# Patient Record
Sex: FEMALE | URBAN - METROPOLITAN AREA
[De-identification: names, ages, dates, MRNs, and addresses within clinical notes are randomized per-mention and may not be internally consistent; named-entity substitution may affect disease eponyms.]

---

## 2017-08-19 LAB — CREATININE, EXTERNAL: 0.63

## 2018-04-03 ENCOUNTER — OFFICE VISIT (OUTPATIENT)
Dept: ENDOCRINOLOGY | Age: 38
End: 2018-04-03

## 2018-04-03 VITALS
WEIGHT: 169 LBS | OXYGEN SATURATION: 98 % | DIASTOLIC BLOOD PRESSURE: 77 MMHG | BODY MASS INDEX: 38.02 KG/M2 | HEIGHT: 56 IN | RESPIRATION RATE: 16 BRPM | HEART RATE: 80 BPM | SYSTOLIC BLOOD PRESSURE: 100 MMHG

## 2018-04-03 DIAGNOSIS — E03.9 ACQUIRED HYPOTHYROIDISM: Primary | ICD-10-CM

## 2018-04-03 RX ORDER — NORETHINDRONE ACETATE AND ETHINYL ESTRADIOL 1.5; 3 MG/1; UG/1
TABLET ORAL
Refills: 1 | COMMUNITY
Start: 2018-03-30

## 2018-04-03 RX ORDER — ACETAMINOPHEN AND CODEINE PHOSPHATE 300; 30 MG/1; MG/1
TABLET ORAL
Refills: 0 | COMMUNITY
Start: 2018-03-23

## 2018-04-03 RX ORDER — LEVOTHYROXINE SODIUM 125 UG/1
TABLET ORAL
Refills: 6 | COMMUNITY
Start: 2018-03-21 | End: 2018-04-05 | Stop reason: DRUGHIGH

## 2018-04-03 RX ORDER — TOPIRAMATE 25 MG/1
TABLET ORAL 2 TIMES DAILY
COMMUNITY

## 2018-04-03 RX ORDER — LEVOTHYROXINE SODIUM 112 UG/1
TABLET ORAL
Refills: 2 | COMMUNITY
Start: 2018-01-01 | End: 2018-04-05 | Stop reason: DRUGHIGH

## 2018-04-03 NOTE — MR AVS SNAPSHOT
[address] [facility] 
[phone] Patient: Evelio Dawson MRN: [id number] Visit Information Date & Time Provider Department Dept. Phone Encounter #  
 4/3/2018 11:10 AM Kori Kemp, [address] [facility] Endocr[facility]ology [phone] Upcoming Health Maintenance Date Due DTaP/Tdap/Td series (1 - Tdap) 1/19/2001 PAP AKA CERVICAL CYTOLOGY 1/19/2001 Influenza Age 5 to Adult 8/1/2017 Allergies as of 4/3/2018  Review Complete On: 4/3/2018 By: Kori Kemp MD  
  
 Severity Noted Reaction Type Reactions Fioricet [Butalbital-acetaminophen-caff] High 09/26/2016    Hives Current Immunizations  Never Reviewed No immunizations on file. Not reviewed this visit You Were Diagnosed With   
  
 Codes Comments Acquired hypothyroidism    -  Primary ICD-10-CM: E03.9 ICD-9-CM: 083. 9 Vitals BP Pulse Resp Height(growth percentile) Weight(growth percentile) SpO2  
 100/77 80 16 4' 8\" (1.422 m) 169 lb (76.7 kg) 98% BMI OB Status Smoking Status 37.89 kg/m2 Having regular periods Never Smoker Vitals History BMI and BSA Data Body Mass Index Body Surface Area  
 37.89 kg/m 2 1.74 m 2 Preferred Pharmacy Pharmacy Name Phone [facility] [address] [facility] [phone] Your Updated Medication List  
  
   
This list is accurate as of 4/3/18 12:18 PM.  Always use your most recent med list.  
  
  
  
  
 acetaminophen-codeine 300-30 mg per tablet Commonly known as:  TYLENOL #3 EXCEDRIN EXTRA STRENGTH 250-250-65 mg per tablet Generic drug:  aspirin-acetaminophen-caffeine Take 2 Tabs by mouth every six (6) hours as needed for Headache. FLUoxetine 20 mg capsule Commonly known as:  PROzac  
1 po daily HYDROcodone-acetaminophen  mg tablet Commonly known as:  Gabriella Alex TK 1 T PO BID PRF MIGRAINE  
  
 ibuprofen 200 mg tablet Commonly known as:  MOTRIN Take 800 mg by mouth every eight (8) hours as needed for Pain.  
  
 ketorolac 10 mg tablet Commonly known as:  TORADOL  
1 or 2 po bid prn pain, take with food, do not take with other NSAIDs, do not exceed 4 pills daily * levothyroxine 75 mcg tablet Commonly known as:  SYNTHROID  
1 po daily * levothyroxine 112 mcg tablet Commonly known as:  SYNTHROID TK 1 T PO D  
  
 * levothyroxine 125 mcg tablet Commonly known as:  SYNTHROID TK 1 T PO QD IN THE MORNING OES  
  
 MICROGESTIN 1.5/30 (21) 1.5-30 mg-mcg Tab Generic drug:  norethindrone ac-eth estradiol TK 1 T PO QD TAKE ACTIVE TABLETS CONTINOUSLY  
  
 multivitamin tablet Commonly known as:  ONE A DAY Take 1 Tab by mouth daily. naproxen sodium 220 mg tablet Commonly known as:  NAPROSYN Take 660 mg by mouth every eight (8) hours as needed. * topiramate 25 mg tablet Commonly known as:  TOPAMAX Take  by mouth two (2) times a day. * topiramate 50 mg tablet Commonly known as:  TOPAMAX 1 po bid ZOFRAN (AS HYDROCHLORIDE) 4 mg tablet Generic drug:  ondansetron hcl Take 4 mg by mouth every eight (8) hours as needed for Nausea. * Notice: This list has 5 medication(s) that are the same as other medications prescribed for you. Read the directions carefully, and ask your doctor or other care provider to review them with you. We Performed the Following T3, FREE [id number] CPT(R)] TSH AND FREE T4 [78484 CPT(R)] Patient Instructions Take your levothyroxine (thyroid medication) first thing in the morning with a glass of water. Wait at least 30 minutes before eating, drinking coffee, or taking other medications.  
 
Wait 2-4 hours before taking any supplements containing calcium or iron (ie: multivitamins or prenatal vitamins).   
 
============================================================================== For fertility, goal TSH is 0.5 to 2.5 Thyroid hormone requirements often increase by about 25% in pregnancy When you find out you're pregnant, take two extra tablets per week and notify me 
 
============================================================================== It is important to note that during pregnancy, TSH concentrations are typically lower than prior to pregnancy due to increased hCG, and frequently TSH is below the lower limit of 0.4 mIU/L. Total T4 may also be high due to increased SHBG. Trimester-specific goals recommended by the SHARRI are as follows: - First trimester: 0.12.5 mIU/L 
 - Second trimester: 0.23.0 mIU/L 
 - Third trimester: 0.33.0 mIU/L Introducing [facility] & [facility] SERVICES! Wenceslao Cotton introduces [employer] patient portal. Now you can access parts of your medical record, email your doctor's office, and request medication refills online. 1. In your internet browser, go to [email or web address] 2. Click on the First Time User? Click Here link in the Sign In box. You will see the New Member Sign Up page. 3. Enter your [employer] Access Code exactly as it appears below. You will not need to use this code after youve completed the sign-up process. If you do not sign up before the expiration date, you must request a new code. · [employer] Access Code: [email or web address] Expires: 7/2/2018 12:18 PM 
 
4. Enter the last four digits of your Social Security Number (xxxx) and Date of Birth (mm/dd/yyyy) as indicated and click Submit. You will be taken to the next sign-up page. 5. Create a Loogl[employer] ID. This will be your [employer] login ID and cannot be changed, so think of one that is secure and easy to remember. 6. Create a [employer] password. You can change your password at any time. 7. Enter your Password Reset Question and Answer. This can be used at a later time if you forget your password. 8. Enter your e-mail address. You will receive e-mail notification when new information is available in [address] 9. Click Sign Up. You can now view and download portions of your medical record. 10. Click the Download Summary menu link to download a portable copy of your medical information. If you have questions, please visit the Frequently Asked Questions section of the [employer] website. Remember, [employer] is NOT to be used for urgent needs. For medical emergencies, dial 911. Now available from your iPhone and Android! Please provide this summary of care documentation to your next provider. Your primary care clinician is listed as Yared Perry. If you have any questions after today's visit, please call [phone].

## 2018-04-03 NOTE — PATIENT INSTRUCTIONS
Take your levothyroxine (thyroid medication) first thing in the morning with a glass of water. Wait at least 30 minutes before eating, drinking coffee, or taking other medications. Wait 2-4 hours before taking any supplements containing calcium or iron (ie: multivitamins or prenatal vitamins).      ==============================================================================  For fertility, goal TSH is 0.5 to 2.5    Thyroid hormone requirements often increase by about 25% in pregnancy  When you find out you're pregnant, take two extra tablets per week and notify me    ==============================================================================    It is important to note that during pregnancy, TSH concentrations are typically lower than prior to pregnancy due to increased hCG, and frequently TSH is below the lower limit of 0.4 mIU/L. Total T4 may also be high due to increased SHBG.  Trimester-specific goals recommended by the SHARRI are as follows:    - First trimester: 0.1-2.5 mIU/L   - Second trimester: 0.2-3.0 mIU/L   - Third trimester: 0.3-3.0 mIU/L

## 2018-04-03 NOTE — PROGRESS NOTES
Endocrinology Visit    CC: hypothyroidism    Referring provider: Viktoria Watkins MD     History of present illness:  Ollie Null is a 45 y.o. female here for hypothyroidism. She was diagnosed around age 24 and has been on various doses of levothyroxine since then. Her levels have been \"up and down\" and she is trying to achieve a normal TSH for fertility, will be undergoing IVF in the near future (f/b Dr Uche Bob at [facility]). Last TSH was ~0.2 in February and she says her levothyroxine dose was increased from 112 to 125 mcg daily at that time. TSH values last year were elevated while taking 112 mcg daily. She currently takes generic levothyroxine 125 mcg daily. She takes this correctly on an empty stomach, first thing in the morning waiting 30-60 minutes for food, however she also takes her other medications (including a prenatal vitamin) in the morning with her thyroid medidcation. She reports chronic cold intolerance, constipation/diarrhea, and weight fluctuations. She denies racing heart, tremor, palpitations, or changes to her energy level. Cycles are regular, but heavy. She has two children and had her tubes tied prior to meeting her current , so that is the reason for pursuing IVF. She has a strong family history of hypothyroidism.      ROS: see HPI for pertinent positives and negatives, otherwise a 10 system review is negative    Problem list:  Patient Active Problem List   Diagnosis Code    Major depressive disorder in remission ([address] F32.5    Anxiety F41.9    Acquired hypothyroidism E03.9    Nonintractable migraine G43.009       Medical history:  Past Medical History:   Diagnosis Date    Endometriosis     Headache     Thyroid disease        Past surgical history:  Past Surgical History:   Procedure Laterality Date    HX CHOLECYSTECTOMY      HX GYN         Medications:    Current Outpatient Prescriptions:     levothyroxine (SYNTHROID) 125 mcg tablet, TK 1 T PO QD IN THE MORNING OES, Disp: , Rfl: 6    MICROGESTIN 1.5/30, 21, 1.5-30 mg-mcg tab, TK 1 T PO QD TAKE ACTIVE TABLETS CONTINOUSLY, Disp: , Rfl: 1    acetaminophen-codeine (TYLENOL #3) 300-30 mg per tablet, , Disp: , Rfl: 0    topiramate (TOPAMAX) 25 mg tablet, Take  by mouth two (2) times a day., Disp: , Rfl:     ondansetron hcl (ZOFRAN, AS HYDROCHLORIDE,) 4 mg tablet, Take 4 mg by mouth every eight (8) hours as needed for Nausea., Disp: , Rfl:     multivitamin (ONE A DAY) tablet, Take 1 Tab by mouth daily. , Disp: , Rfl:     aspirin-acetaminophen-caffeine (EXCEDRIN EXTRA STRENGTH) 250-250-65 mg per tablet, Take 2 Tabs by mouth every six (6) hours as needed for Headache., Disp: , Rfl:     ibuprofen (MOTRIN) 200 mg tablet, Take 800 mg by mouth every eight (8) hours as needed for Pain., Disp: , Rfl:     levothyroxine (SYNTHROID) 112 mcg tablet, TK 1 T PO D, Disp: , Rfl: 2    ketorolac (TORADOL) 10 mg tablet, 1 or 2 po bid prn pain, take with food, do not take with other NSAIDs, do not exceed 4 pills daily, Disp: 20 Tab, Rfl: 0    HYDROcodone-acetaminophen (NORCO)  mg tablet, TK 1 T PO BID PRF MIGRAINE, Disp: , Rfl: 0    naproxen sodium (NAPROSYN) 220 mg tablet, Take 660 mg by mouth every eight (8) hours as needed. , Disp: , Rfl:     FLUoxetine (PROZAC) 20 mg capsule, 1 po daily, Disp: 30 Cap, Rfl: 3    levothyroxine (SYNTHROID) 75 mcg tablet, 1 po daily, Disp: 30 Tab, Rfl: 3    topiramate (TOPAMAX) 50 mg tablet, 1 po bid, Disp: 60 Tab, Rfl: 3    Allergies: Allergies   Allergen Reactions    Fioricet [Butalbital-Acetaminophen-Caff] Hives       Social History:  Social History     Social History    Marital status: UNKNOWN     Spouse name: N/A    Number of children: N/A    Years of education: N/A     Occupational History    Not on file.      Social History Main Topics    Smoking status: Never Smoker    Smokeless tobacco: Never Used    Alcohol use 1.8 oz/week     1 Glasses of wine, 1 Cans of beer, 1 Shots of liquor per week Comment: rarely    Drug use: No    Sexual activity: Not on file     Other Topics Concern    Not on file     Social History Narrative       Family History:  Family History   Problem Relation Age of Onset    MS Mother     Thyroid Disease Mother     No Known Problems Father        Physical Exam:  Visit Vitals    /77    Pulse 80    Resp 16    Ht 4' 8\" (1.422 m)    Wt 169 lb (76.7 kg)    SpO2 98%    BMI 37.89 kg/m2     Gen: NAD  Heent: mucous membranes moist, no lid lag, stare or exophthalmos. No scleral or conjunctival injection. Extra ocular muscles intact . Thyroid: moves well with swallowing, normal size, normal texture, no thyroid bruit, negative pembertons sign, no masses appreciated  Heme/lymph: no cervical, supraclavicular or submandibular lymphadenopathy is appreciated. Pulmonary: clear to auscultation bilaterally, no wheezes/rhonchi or rales  Cardiovascular: regular rate and rhythm, no murmurs, rubs or gallops, good distal pulses  Extremities: no clubbing, cyanosis or edema. No onocholysis   Neuro: no tremor of the outstretch hands, reflexes are normal with normal relaxation phase. Normal gait, normal concentration  Skin: normal texture, warm and dry   Psyche: normal affect with good insight into her medical conditions    Pertinent lab review: There are no results available in [facility]. Pertinent lab results from outside records are transcribed in HPI and scanned into the medical record. Assessment and plan:  Aneudy Strauss is a pleasant 45 y.o. female here for hypothyroidism. She clinically appears euthyroid by exam. To further assess thyroid hormone economy, we will check a TSH, Free T4, and Free T3 today (since she has been on current dose of 125 mcg daily for 2 months). We discussed that goal TSH for fertility is 0.5-2.5. If TSH is not within this range, I will adjust her dose accordingly and have her do f/u levels in 8 weeks.  She understands the importance of euthyroidism prior to conception, and will not proceed with IVF until we get her TSH to goal. When she becomes pregnant she can take two extra tablets a week and call me for lab levels immediately. I spent 30 minutes with the patient today and > 50% of the time was spent counseling the patient about hypothyroidism: its etiology, clinical manifestations, diagnosis, and management. She will return in 4 months time. We will communicate via Flowboardt in the interim. Thank you for the opportunity to partake in this patients care.   Sumi Casillas MD  [facility]  [facility]

## 2018-04-04 LAB
T3FREE SERPL-MCNC: 2.3 PG/ML (ref 2–4.4)
T4 FREE SERPL-MCNC: 1.22 NG/DL (ref 0.82–1.77)
TSH SERPL DL<=0.005 MIU/L-ACNC: 14.87 UIU/ML (ref 0.45–4.5)

## 2018-04-05 ENCOUNTER — PATIENT MESSAGE (OUTPATIENT)
Dept: ENDOCRINOLOGY | Age: 38
End: 2018-04-05

## 2018-04-05 RX ORDER — LEVOTHYROXINE SODIUM 137 UG/1
137 TABLET ORAL
Qty: 30 TAB | Refills: 5 | Status: SHIPPED | OUTPATIENT
Start: 2018-04-05

## 2018-04-05 NOTE — TELEPHONE ENCOUNTER
From: Tatiana Pedroza  To: Mima Alpers, MD  Sent: 4/5/2018 11:30 AM EDT  Subject: Prescription Question    Hi Dr Khushboo Friend,    My Thryoid levels look good based off of our conversation; however we did discuss changing my brand of meds that I am taking now. Would you suggest to have a new Script sent in with the brand name or do I just finish the one that I have now and wait until next month? Thank You!   Jenniffer Diaz

## 2022-11-20 ENCOUNTER — APPOINTMENT (OUTPATIENT)
Dept: CT IMAGING | Age: 42
End: 2022-11-20
Attending: EMERGENCY MEDICINE
Payer: COMMERCIAL

## 2022-11-20 ENCOUNTER — HOSPITAL ENCOUNTER (EMERGENCY)
Age: 42
Discharge: HOME | End: 2022-11-20
Attending: EMERGENCY MEDICINE
Payer: COMMERCIAL

## 2022-11-20 VITALS
TEMPERATURE: 97.4 F | RESPIRATION RATE: 16 BRPM | OXYGEN SATURATION: 98 % | DIASTOLIC BLOOD PRESSURE: 79 MMHG | HEART RATE: 101 BPM | HEIGHT: 57 IN | SYSTOLIC BLOOD PRESSURE: 140 MMHG | WEIGHT: 175 LBS | BODY MASS INDEX: 37.76 KG/M2

## 2022-11-20 DIAGNOSIS — B96.89 BV (BACTERIAL VAGINOSIS): ICD-10-CM

## 2022-11-20 DIAGNOSIS — N76.0 BV (BACTERIAL VAGINOSIS): ICD-10-CM

## 2022-11-20 DIAGNOSIS — R30.0 DYSURIA: ICD-10-CM

## 2022-11-20 DIAGNOSIS — N83.202 LEFT OVARIAN CYST: ICD-10-CM

## 2022-11-20 DIAGNOSIS — K42.9 UMBILICAL HERNIA WITHOUT OBSTRUCTION AND WITHOUT GANGRENE: ICD-10-CM

## 2022-11-20 DIAGNOSIS — M54.50 ACUTE RIGHT-SIDED LOW BACK PAIN WITHOUT SCIATICA: Primary | ICD-10-CM

## 2022-11-20 LAB
ALBUMIN SERPL-MCNC: 4.3 G/DL (ref 3.5–5.2)
ALBUMIN/GLOB SERPL: 1.3 {RATIO} (ref 1.1–2.2)
ALP SERPL-CCNC: 93 U/L (ref 35–104)
ALT SERPL-CCNC: 13 U/L (ref 10–35)
ANION GAP SERPL CALC-SCNC: 11 MMOL/L (ref 5–15)
APPEARANCE UR: ABNORMAL
AST SERPL-CCNC: 13 U/L (ref 10–35)
BACTERIA URNS QL MICRO: ABNORMAL /HPF
BASOPHILS # BLD: 0 K/UL (ref 0–0.1)
BASOPHILS NFR BLD: 0 % (ref 0–1)
BILIRUB SERPL-MCNC: 0.3 MG/DL (ref 0.2–1)
BILIRUB UR QL: NEGATIVE
BUN SERPL-MCNC: 11 MG/DL (ref 6–20)
BUN/CREAT SERPL: 22 (ref 12–20)
CALCIUM SERPL-MCNC: 9.8 MG/DL (ref 8.6–10)
CHLORIDE SERPL-SCNC: 105 MMOL/L (ref 98–107)
CLUE CELLS VAG QL WET PREP: NORMAL
CO2 SERPL-SCNC: 24 MMOL/L (ref 22–29)
COLOR UR: ABNORMAL
CREAT SERPL-MCNC: 0.49 MG/DL (ref 0.5–0.9)
DIFFERENTIAL METHOD BLD: ABNORMAL
EOSINOPHIL # BLD: 0.2 K/UL (ref 0–0.4)
EOSINOPHIL NFR BLD: 2 % (ref 0–7)
EPITH CASTS URNS QL MICRO: ABNORMAL /LPF
ERYTHROCYTE [DISTWIDTH] IN BLOOD BY AUTOMATED COUNT: 13.7 % (ref 11.5–14.5)
GLOBULIN SER CALC-MCNC: 3.2 G/DL (ref 2–4)
GLUCOSE SERPL-MCNC: 92 MG/DL (ref 65–100)
GLUCOSE UR STRIP.AUTO-MCNC: NEGATIVE MG/DL
HCT VFR BLD AUTO: 42.3 % (ref 35–47)
HGB BLD-MCNC: 14.2 G/DL (ref 11.5–16)
HGB UR QL STRIP: NEGATIVE
IMM GRANULOCYTES # BLD AUTO: 0.1 K/UL (ref 0–0.04)
IMM GRANULOCYTES NFR BLD AUTO: 1 % (ref 0–0.5)
KETONES UR QL STRIP.AUTO: NEGATIVE MG/DL
KOH PREP SPEC: NORMAL
LEUKOCYTE ESTERASE UR QL STRIP.AUTO: NEGATIVE
LYMPHOCYTES # BLD: 2.1 K/UL (ref 0.8–3.5)
LYMPHOCYTES NFR BLD: 22 % (ref 12–49)
MCH RBC QN AUTO: 29.2 PG (ref 26–34)
MCHC RBC AUTO-ENTMCNC: 33.6 G/DL (ref 30–36.5)
MCV RBC AUTO: 87 FL (ref 80–99)
MONOCYTES # BLD: 0.8 K/UL (ref 0–1)
MONOCYTES NFR BLD: 9 % (ref 5–13)
NEUTS SEG # BLD: 6.3 K/UL (ref 1.8–8)
NEUTS SEG NFR BLD: 66 % (ref 32–75)
NITRITE UR QL STRIP.AUTO: NEGATIVE
NRBC # BLD: 0 K/UL (ref 0–0.01)
NRBC BLD-RTO: 0 PER 100 WBC
PH UR STRIP: 5.5 [PH] (ref 5–8)
PLATELET # BLD AUTO: 272 K/UL (ref 150–400)
PMV BLD AUTO: 10.3 FL (ref 8.9–12.9)
POTASSIUM SERPL-SCNC: 4 MMOL/L (ref 3.5–5.1)
PROT SERPL-MCNC: 7.5 G/DL (ref 6.4–8.3)
PROT UR STRIP-MCNC: NEGATIVE MG/DL
RBC # BLD AUTO: 4.86 M/UL (ref 3.8–5.2)
RBC #/AREA URNS HPF: ABNORMAL /HPF
SERVICE CMNT-IMP: NORMAL
SODIUM SERPL-SCNC: 140 MMOL/L (ref 136–145)
SP GR UR REFRACTOMETRY: 1.01 (ref 1–1.03)
T VAGINALIS VAG QL WET PREP: NORMAL
UR CULT HOLD, URHOLD: NORMAL
UROBILINOGEN UR QL STRIP.AUTO: 0.2 EU/DL (ref 0.2–1)
WBC # BLD AUTO: 9.6 K/UL (ref 3.6–11)
WBC URNS QL MICRO: ABNORMAL /HPF (ref 0–4)

## 2022-11-20 PROCEDURE — 74011250637 HC RX REV CODE- 250/637: Performed by: EMERGENCY MEDICINE

## 2022-11-20 PROCEDURE — 87491 CHLMYD TRACH DNA AMP PROBE: CPT

## 2022-11-20 PROCEDURE — 36415 COLL VENOUS BLD VENIPUNCTURE: CPT

## 2022-11-20 PROCEDURE — 81001 URINALYSIS AUTO W/SCOPE: CPT

## 2022-11-20 PROCEDURE — 99284 EMERGENCY DEPT VISIT MOD MDM: CPT

## 2022-11-20 PROCEDURE — 87086 URINE CULTURE/COLONY COUNT: CPT

## 2022-11-20 PROCEDURE — 87210 SMEAR WET MOUNT SALINE/INK: CPT

## 2022-11-20 PROCEDURE — 85025 COMPLETE CBC W/AUTO DIFF WBC: CPT

## 2022-11-20 PROCEDURE — 74176 CT ABD & PELVIS W/O CONTRAST: CPT

## 2022-11-20 PROCEDURE — 80053 COMPREHEN METABOLIC PANEL: CPT

## 2022-11-20 RX ORDER — CEFDINIR 300 MG/1
300 CAPSULE ORAL
Status: COMPLETED | OUTPATIENT
Start: 2022-11-20 | End: 2022-11-20

## 2022-11-20 RX ORDER — METRONIDAZOLE 500 MG/1
500 TABLET ORAL 2 TIMES DAILY
Qty: 14 TABLET | Refills: 0 | Status: SHIPPED | OUTPATIENT
Start: 2022-11-20 | End: 2022-11-27

## 2022-11-20 RX ORDER — CEFDINIR 300 MG/1
300 CAPSULE ORAL 2 TIMES DAILY
Qty: 19 CAPSULE | Refills: 0 | Status: SHIPPED | OUTPATIENT
Start: 2022-11-20 | End: 2022-11-30

## 2022-11-20 RX ADMIN — CEFDINIR 300 MG: 300 CAPSULE ORAL at 13:48

## 2022-11-20 NOTE — ED PROVIDER NOTES
HPI     Please note that this dictation was completed with [employer], the computer voice recognition software. Quite often unanticipated grammatical, syntax, homophones, and other interpretive errors are inadvertently transcribed by the computer software. Please disregard these errors. Please excuse any errors that have escaped final proofreading. 27-year-old female with a history of hysterectomy, endometriosis, 6 prior UTIs in the last year now with urinary symptoms and right flank pain. Patient had 3 days of developing and worsening right back pain and flank pain, suprapubic discomfort. Is associated with urinary frequency and dysuria. She has also noticed some whitish vaginal discharge which she thinks may be a yeast infection. Denies any vomiting but has nausea without fever. She reports that her last urinalysis was negative but later grew an organism on urine culture. She was treated with an antibiotic. Denies any other complaints. She took Tylenol with limited relief 3 hours ago. She is driving. Social history: . Non-smoker. Past Medical History:   Diagnosis Date    Endometriosis     Headache     Thyroid disease        Past Surgical History:   Procedure Laterality Date    HX CHOLECYSTECTOMY      HX GYN           Family History:   Problem Relation Age of Onset    Mult Sclerosis Mother     Thyroid Disease Mother     No Known Problems Father        Social History     Socioeconomic History    Marital status: UNKNOWN     Spouse name: Not on file    Number of children: Not on file    Years of education: Not on file    Highest education level: Not on file   Occupational History    Not on file   Tobacco Use    Smoking status: Never    Smokeless tobacco: Never   Substance and Sexual Activity    Alcohol use:  Yes     Alcohol/week: 3.0 standard drinks     Types: 1 Glasses of wine, 1 Cans of beer, 1 Shots of liquor per week     Comment: rarely    Drug use: No    Sexual activity: Not on file Other Topics Concern    Not on file   Social History Narrative    Not on file     Social Determinants of Health     Financial Resource Strain: Not on file   Food Insecurity: Not on file   Transportation Needs: Not on file   Physical Activity: Not on file   Stress: Not on file   Social Connections: Not on file   Intimate Partner Violence: Not on file   Housing Stability: Not on file         ALLERGIES: Fioricet [butalbital-acetaminophen-caff] and Toradol [ketorolac]    Review of Systems   Constitutional:  Negative for chills and fever. Gastrointestinal:  Negative for diarrhea, nausea and vomiting. Genitourinary:  Positive for dysuria, frequency, pelvic pain (suprapubic) and vaginal discharge. Negative for hematuria and vaginal bleeding. All other systems reviewed and are negative. Vitals:    11/20/22 1141 11/20/22 1142   BP:  (!) 140/79   Pulse: (!) 101    Resp: 16    Temp: 97.4 °F (36.3 °C)    SpO2: 98%    Weight: 79.4 kg (175 lb)    Height: 4' 9\" (1.448 m)             Physical Exam  Vitals and nursing note reviewed. Constitutional:       Appearance: Normal appearance. She is well-developed. HENT:      Head: Normocephalic and atraumatic. Nose: No congestion or rhinorrhea. Mouth/Throat:      Mouth: Mucous membranes are moist.   Eyes:      General: No scleral icterus. Right eye: No discharge. Left eye: No discharge. Conjunctiva/sclera: Conjunctivae normal.   Cardiovascular:      Rate and Rhythm: Normal rate and regular rhythm. Heart sounds: Normal heart sounds. No murmur heard. No friction rub. No gallop. Pulmonary:      Effort: Pulmonary effort is normal. No respiratory distress. Breath sounds: Normal breath sounds. No wheezing or rales. Abdominal:      General: There is no distension. Palpations: Abdomen is soft. Tenderness: There is no abdominal tenderness. There is no guarding. Musculoskeletal:         General: No swelling or deformity. Normal range of motion. Cervical back: Normal range of motion and neck supple. No rigidity. Right lower leg: No edema. Left lower leg: No edema. Lymphadenopathy:      Cervical: No cervical adenopathy. Skin:     General: Skin is warm and dry. Coloration: Skin is not jaundiced or pale. Findings: No rash. Neurological:      Mental Status: She is alert and oriented to person, place, and time. Comments: JIMBO VELASQUEZ       70-year-old male with a history of frequent UTIs and hysterectomy here with right flank pain and associated urinary symptoms. Previous urinalysis did not show anything according to the patient and later grew an organism on urine culture. She has not seen urology so far. Differential diagnosis includes kidney stone, UTI, pyelonephritis, vaginal yeast infection and others. Self swab vaginal swabs, urinalysis, labs, CT stone study. Patient driving and allergic to Toradol so additional pain medication ordered. Procedures        1:45 PM  Patient with left ovarian cyst unlikely to be a contributing factor to her symptoms. Positive bacterial vaginosis so will prescribe Flagyl. We will also prescribe Omnicef given her urinary symptoms and CVA tenderness and concern for possible pyelonephritis. She has had negative urinalysis is in the past with her previous UTIs. She will follow-up with her primary care doctor. Follow-up with her GYN regarding the ovarian cyst.  Return precautions explained for the ovarian cyst no current symptoms as well.    1:46 PM  Patient's results have been reviewed with them. Patient and/or family have verbally conveyed their understanding and agreement of the patient's signs, symptoms, diagnosis, treatment and prognosis and additionally agree to follow up as recommended or return to the Emergency Room should their condition change prior to follow-up.   Discharge instructions have also been provided to the patient with some educational information regarding their diagnosis as well a list of reasons why they would want to return to the ER prior to their follow-up appointment should their condition change. Recent Results (from the past 24 hour(s))   URINALYSIS W/MICROSCOPIC    Collection Time: 11/20/22 11:41 AM   Result Value Ref Range    Color YELLOW/STRAW      Appearance HAZY (A) CLEAR      Specific gravity 1.010 1.003 - 1.030      pH (UA) 5.5 5.0 - 8.0      Protein Negative NEG mg/dL    Glucose Negative NEG mg/dL    Ketone Negative NEG mg/dL    Bilirubin Negative NEG      Blood Negative NEG      Urobilinogen 0.2 0.2 - 1.0 EU/dL    Nitrites Negative NEG      Leukocyte Esterase Negative NEG      WBC 0-4 0 - 4 /hpf    RBC 0-5 /hpf    Epithelial cells MANY (A) FEW /lpf    Bacteria 2+ (A) NEG /hpf   URINE CULTURE HOLD SAMPLE    Collection Time: 11/20/22 11:41 AM    Specimen: Serum; Urine   Result Value Ref Range    Urine culture hold        Urine on hold in Microbiology dept for 2 days. If unpreserved urine is submitted, it cannot be used for addtional testing after 24 hours, recollection will be required. METABOLIC PANEL, COMPREHENSIVE    Collection Time: 11/20/22 12:38 PM   Result Value Ref Range    Sodium 140 136 - 145 mmol/L    Potassium 4.0 3.5 - 5.1 mmol/L    Chloride 105 98 - 107 mmol/L    CO2 24 22 - 29 mmol/L    Anion gap 11 5 - 15 mmol/L    Glucose 92 65 - 100 mg/dL    BUN 11 6 - 20 MG/DL    Creatinine 0.49 (L) 0.50 - 0.90 MG/DL    BUN/Creatinine ratio 22 (H) 12 - 20      eGFR >60 >60 ml/min/1.73m2    Calcium 9.8 8.6 - 10.0 MG/DL    Bilirubin, total 0.3 0.2 - 1.0 MG/DL    ALT (SGPT) 13 10 - 35 U/L    AST (SGOT) 13 10 - 35 U/L    Alk.  phosphatase 93 35 - 104 U/L    Protein, total 7.5 6.4 - 8.3 g/dL    Albumin 4.3 3.5 - 5.2 g/dL    Globulin 3.2 2.0 - 4.0 g/dL    A-G Ratio 1.3 1.1 - 2.2     CBC WITH AUTOMATED DIFF    Collection Time: 11/20/22 12:38 PM   Result Value Ref Range    WBC 9.6 3.6 - 11.0 K/uL    RBC 4.86 3.80 - 5.20 M/uL    HGB 14.2 11.5 - 16.0 g/dL    HCT 42.3 35.0 - 47.0 %    MCV 87.0 80.0 - 99.0 FL    MCH 29.2 26.0 - 34.0 PG    MCHC 33.6 30.0 - 36.5 g/dL    RDW 13.7 11.5 - 14.5 %    PLATELET 623 233 - 974 K/uL    MPV 10.3 8.9 - 12.9 FL    NRBC 0.0 0  WBC    ABSOLUTE NRBC 0.00 0.00 - 0.01 K/uL    NEUTROPHILS 66 32 - 75 %    LYMPHOCYTES 22 12 - 49 %    MONOCYTES 9 5 - 13 %    EOSINOPHILS 2 0 - 7 %    BASOPHILS 0 0 - 1 %    IMMATURE GRANULOCYTES 1 (H) 0.0 - 0.5 %    ABS. NEUTROPHILS 6.3 1.8 - 8.0 K/UL    ABS. LYMPHOCYTES 2.1 0.8 - 3.5 K/UL    ABS. MONOCYTES 0.8 0.0 - 1.0 K/UL    ABS. EOSINOPHILS 0.2 0.0 - 0.4 K/UL    ABS. BASOPHILS 0.0 0.0 - 0.1 K/UL    ABS. IMM. GRANS. 0.1 (H) 0.00 - 0.04 K/UL    DF AUTOMATED     PARAS, OTHER SOURCES    Collection Time: 11/20/22 12:38 PM    Specimen: Vagina; Other   Result Value Ref Range    Special Requests: NO SPECIAL REQUESTS      KOH NO YEAST SEEN     WET PREP    Collection Time: 11/20/22 12:38 PM    Specimen: Miscellaneous sample   Result Value Ref Range    Clue cells CLUE CELLS PRESENT      Wet prep NO TRICHOMONAS SEEN         CT ABD PELV WO CONT    Result Date: 11/20/2022  INDICATION: right flank pain with dysuria COMPARISON: None TECHNIQUE: Noncontrast thin axial images were obtained through the abdomen and pelvis. Coronal and sagittal reconstructions were generated. CT dose reduction was achieved through use of a standardized protocol tailored for this examination and automatic exposure control for dose modulation. FINDINGS: LUNG BASES: No abnormality. LIVER: No mass or biliary dilatation. GALLBLADDER: Surgically absent. SPLEEN: No enlargement or lesion. PANCREAS: No mass or ductal dilatation. ADRENALS: No mass. KIDNEYS: No nephrolithiasis or hydronephrosis. GI TRACT:  No bowel obstruction. Difficult to assess bowel wall thickening given lack of oral contrast material. PERITONEUM: No free air or free fluid. APPENDIX: Unremarkable. RETROPERITONEUM: No aortic aneurysm.  LYMPH NODES:  None enlarged. ADDITIONAL COMMENTS: Small fat-containing umbilical hernia. URINARY BLADDER: Unremarkable. REPRODUCTIVE ORGANS: Prior hysterectomy. 3.1 cm left ovarian cyst. LYMPH NODES:  None enlarged. FREE FLUID:  None. BONES: No destructive bone lesion. ADDITIONAL COMMENTS: N/A.     1. No nephrolithiasis or hydronephrosis. 2. 3.1 cm left ovarian cyst. 3. Small fat-containing umbilical hernia.

## 2022-11-20 NOTE — ED TRIAGE NOTES
Pt to ER with c/o right flank pain, urinary frequency, and urinary discomfort for a few days. Pt denies any fever at home.

## 2022-11-21 LAB
BACTERIA SPEC CULT: NORMAL
SERVICE CMNT-IMP: NORMAL

## 2022-11-22 LAB
C TRACH RRNA SPEC QL NAA+PROBE: NEGATIVE
N GONORRHOEA RRNA SPEC QL NAA+PROBE: NEGATIVE
SPECIMEN SOURCE: NORMAL

## 2023-05-21 RX ORDER — NAPROXEN SODIUM 220 MG
660 TABLET ORAL EVERY 8 HOURS PRN
COMMUNITY

## 2023-05-21 RX ORDER — ACETAMINOPHEN, ASPIRIN AND CAFFEINE 250; 250; 65 MG/1; MG/1; MG/1
2 TABLET, FILM COATED ORAL EVERY 6 HOURS PRN
COMMUNITY

## 2023-05-21 RX ORDER — IBUPROFEN 200 MG
800 TABLET ORAL EVERY 8 HOURS PRN
COMMUNITY

## 2023-05-21 RX ORDER — ACETAMINOPHEN AND CODEINE PHOSPHATE 300; 30 MG/1; MG/1
TABLET ORAL
COMMUNITY
Start: 2018-03-23

## 2023-05-21 RX ORDER — KETOROLAC TROMETHAMINE 10 MG/1
TABLET, FILM COATED ORAL
COMMUNITY
Start: 2016-11-01

## 2023-05-21 RX ORDER — LEVOTHYROXINE SODIUM 137 UG/1
137 TABLET ORAL
COMMUNITY
Start: 2018-04-05

## 2023-05-21 RX ORDER — NORETHINDRONE ACETATE AND ETHINYL ESTRADIOL .03; 1.5 MG/1; MG/1
TABLET ORAL
COMMUNITY
Start: 2018-03-30

## 2023-05-21 RX ORDER — HYDROCODONE BITARTRATE AND ACETAMINOPHEN 10; 325 MG/1; MG/1
TABLET ORAL
COMMUNITY
Start: 2016-09-14

## 2023-05-21 RX ORDER — TOPIRAMATE 25 MG/1
TABLET ORAL 2 TIMES DAILY
COMMUNITY

## 2023-05-21 RX ORDER — ONDANSETRON 4 MG/1
4 TABLET, FILM COATED ORAL EVERY 8 HOURS PRN
COMMUNITY

## 2023-05-21 RX ORDER — TOPIRAMATE 50 MG/1
TABLET, FILM COATED ORAL
COMMUNITY
Start: 2016-09-26

## 2023-05-21 RX ORDER — FLUOXETINE HYDROCHLORIDE 20 MG/1
CAPSULE ORAL
COMMUNITY
Start: 2016-09-26